# Patient Record
Sex: FEMALE
[De-identification: names, ages, dates, MRNs, and addresses within clinical notes are randomized per-mention and may not be internally consistent; named-entity substitution may affect disease eponyms.]

---

## 2019-07-04 ENCOUNTER — HOSPITAL ENCOUNTER (EMERGENCY)
Dept: HOSPITAL 97 - ER | Age: 39
Discharge: ANOTHER HOSPITAL | End: 2019-07-04
Payer: SELF-PAY

## 2019-07-04 DIAGNOSIS — R29.702: ICD-10-CM

## 2019-07-04 DIAGNOSIS — I63.9: Primary | ICD-10-CM

## 2019-07-04 DIAGNOSIS — R47.01: ICD-10-CM

## 2019-07-04 DIAGNOSIS — F17.210: ICD-10-CM

## 2019-07-04 LAB
ALBUMIN SERPL BCP-MCNC: 3.8 G/DL (ref 3.4–5)
ALP SERPL-CCNC: 97 U/L (ref 45–117)
ALT SERPL W P-5'-P-CCNC: 21 U/L (ref 12–78)
AST SERPL W P-5'-P-CCNC: 14 U/L (ref 15–37)
BUN BLD-MCNC: 5 MG/DL (ref 7–18)
GLUCOSE SERPLBLD-MCNC: 162 MG/DL (ref 74–106)
HCT VFR BLD CALC: 59 % (ref 36–45)
INR BLD: 1.14
LYMPHOCYTES # SPEC AUTO: 1.1 K/UL (ref 0.7–4.9)
MAGNESIUM SERPL-MCNC: 2.2 MG/DL (ref 1.8–2.4)
METHAMPHET UR QL SCN: NEGATIVE
NT-PROBNP SERPL-MCNC: 491 PG/ML (ref ?–125)
PMV BLD: 10.8 FL (ref 7.6–11.3)
POTASSIUM SERPL-SCNC: 3.6 MMOL/L (ref 3.5–5.1)
RBC # BLD: 5.99 M/UL (ref 3.86–4.86)
THC SERPL-MCNC: NEGATIVE NG/ML
TROPONIN (EMERG DEPT USE ONLY): < 0.02 NG/ML (ref 0–0.04)
UA COMPLETE W REFLEX CULTURE PNL UR: (no result)

## 2019-07-04 PROCEDURE — 82140 ASSAY OF AMMONIA: CPT

## 2019-07-04 PROCEDURE — 80048 BASIC METABOLIC PNL TOTAL CA: CPT

## 2019-07-04 PROCEDURE — 84145 PROCALCITONIN (PCT): CPT

## 2019-07-04 PROCEDURE — 87086 URINE CULTURE/COLONY COUNT: CPT

## 2019-07-04 PROCEDURE — 81001 URINALYSIS AUTO W/SCOPE: CPT

## 2019-07-04 PROCEDURE — 36415 COLL VENOUS BLD VENIPUNCTURE: CPT

## 2019-07-04 PROCEDURE — 80307 DRUG TEST PRSMV CHEM ANLYZR: CPT

## 2019-07-04 PROCEDURE — 96375 TX/PRO/DX INJ NEW DRUG ADDON: CPT

## 2019-07-04 PROCEDURE — 96366 THER/PROPH/DIAG IV INF ADDON: CPT

## 2019-07-04 PROCEDURE — 93005 ELECTROCARDIOGRAM TRACING: CPT

## 2019-07-04 PROCEDURE — 83735 ASSAY OF MAGNESIUM: CPT

## 2019-07-04 PROCEDURE — 85610 PROTHROMBIN TIME: CPT

## 2019-07-04 PROCEDURE — 70450 CT HEAD/BRAIN W/O DYE: CPT

## 2019-07-04 PROCEDURE — 96365 THER/PROPH/DIAG IV INF INIT: CPT

## 2019-07-04 PROCEDURE — 99285 EMERGENCY DEPT VISIT HI MDM: CPT

## 2019-07-04 PROCEDURE — 83880 ASSAY OF NATRIURETIC PEPTIDE: CPT

## 2019-07-04 PROCEDURE — 84484 ASSAY OF TROPONIN QUANT: CPT

## 2019-07-04 PROCEDURE — 87088 URINE BACTERIA CULTURE: CPT

## 2019-07-04 PROCEDURE — 83605 ASSAY OF LACTIC ACID: CPT

## 2019-07-04 PROCEDURE — 96361 HYDRATE IV INFUSION ADD-ON: CPT

## 2019-07-04 PROCEDURE — 85025 COMPLETE CBC W/AUTO DIFF WBC: CPT

## 2019-07-04 PROCEDURE — 71045 X-RAY EXAM CHEST 1 VIEW: CPT

## 2019-07-04 PROCEDURE — 80076 HEPATIC FUNCTION PANEL: CPT

## 2019-07-04 PROCEDURE — 87040 BLOOD CULTURE FOR BACTERIA: CPT

## 2019-07-04 PROCEDURE — 82962 GLUCOSE BLOOD TEST: CPT

## 2019-07-04 NOTE — EDPHYS
Physician Documentation                                                                           

 [facility]                                                                 

Name: Jesica Park                                                                             

Age: 39 yrs                                                                                       

Sex: Female                                                                                       

: 1980                                                                                   

MRN: [id number]                                                                                   

Arrival Date: 2019                                                                          

Time: 17:41                                                                                       

Account#: [id number]                                                                            

Bed 4                                                                                             

Private MD:                                                                                       

ED Physician Biju Doty                                                                      

HPI:                                                                                              

                                                                                             

18:00 This 39 yrs old  Female presents to ER via Ambulatory with complaints of        cp  

      Depression.                                                                                 

18:00 The patient presents with decreased mental status.                                      cp  

18:00 Onset: The symptoms/episode began/occurred 1.5 week(s) ago. Possible causes: unknown.   cp  

      Associated signs and symptoms: Pertinent negatives: abdominal pain, agitation, chest        

      pain, headache. Patient's baseline: Neuro: alert and fully oriented, Motor: no              

      deficits, Ambulation: walks without assistance, Speech: normal.                             

                                                                                                  

Historical:                                                                                       

- Allergies:                                                                                      

17:42 No Known Allergies;                                                                     aa5 

- Home Meds:                                                                                      

17:42 None [Active];                                                                          aa5 

- PMHx:                                                                                           

17:42 None;                                                                                   aa5 

- PSHx:                                                                                           

17:42 None;                                                                                   aa5 

                                                                                                  

- Immunization history:: Adult Immunizations unknown.                                             

- Social history:: Smoking status: Patient uses tobacco products, smokes one-half pack            

  cigarettes per day, Patient/guardian denies using alcohol, street drugs.                        

- Ebola Screening: : No symptoms or risks identified at this time.                                

                                                                                                  

                                                                                                  

ROS:                                                                                              

18:04 Constitutional: Negative for body aches, chills, fever, poor PO intake.                 cp  

18:04 Cardiovascular: Negative for chest pain.                                                cp  

18:04 Respiratory: Negative for cough, shortness of breath, wheezing.                             

18:04 Abdomen/GI: Negative for abdominal pain, nausea, vomiting, and diarrhea.                    

18:04 Skin: Negative for rash.                                                                    

18:04 Neuro: Positive for altered mental status, Negative for headache.                           

18:04 All other systems are negative.                                                             

                                                                                                  

Exam:                                                                                             

18:10 Constitutional: The patient appears in no acute distress, alert, awake,                 cp  

      non-diaphoretic, non-toxic, well developed, well nourished.                                 

18:10 Head/Face:  Normocephalic, atraumatic.                                                  cp  

18:10 Eyes: Periorbital structures: appear normal, Pupils: equal, round, and reactive to          

      light and accomodation, Extraocular movements: intact throughout, Conjunctiva: normal,      

      no exudate, no injection, Sclera: no appreciated abnormality, Lids and lashes: appear       

      normal, bilaterally.                                                                        

18:10 ENT: External ear(s): are unremarkable, Ear canal(s): are normal, clear, TM's: bulging,     

      is not appreciated, bilaterally, erythema, that is mild, bilaterally, Nose: is normal,      

      Mouth: Lips: moist, Oral mucosa: moist, Posterior pharynx: Airway: no evidence of           

      obstruction, patent, Dental exam: dental caries, that is severe, diffusely.                 

18:10 Neck: ROM/movement: is normal, is supple, without pain, no range of motions                 

      limitations, no meningismus, no nuchal rigidity.                                            

18:10 Chest/axilla: Inspection: normal, Palpation: is normal, no crepitus, no tenderness.         

18:10 Cardiovascular: Rate: tachycardic, Rhythm: regular, Heart sounds: murmur, not               

      appreciated, Edema: is not appreciated.                                                     

18:10 Respiratory: the patient does not display signs of respiratory distress,  Respirations:     

      normal, no use of accessory muscles, no retractions, no splinting, no tachypnea,            

      labored breathing, is not present, Breath sounds: are clear throughout, no decreased        

      breath sounds, no stridor, no wheezing.                                                     

18:10 Abdomen/GI: Inspection: abdomen appears normal, Palpation: abdomen is soft and              

      non-tender, in all quadrants, rebound tenderness, is not appreciated, voluntary             

      guarding, is not appreciated, involuntary guarding, is not appreciated.                     

18:10 Back: pain, is absent, ROM is normal.                                                       

18:10 Skin: no rash present.                                                                      

18:10 Neuro: Orientation: to person, situation, Not oriented to time, Mentation: able to          

      follow commands, slow to respond, Cerebellar function: Romberg testing is negative,         

      normal finger to nose testing, heel to shin testing is normal, Motor: moves all fours,      

      strength is normal, Sensation: no obvious gross deficits.                                   

18:20 ECG was reviewed by the Attending Physician.                                            cp  

                                                                                                  

Vital Signs:                                                                                      

17:43  / 128; Pulse 107; Resp 20 S; Temp 99.3(O); Pulse Ox 96% on R/A;                  aa5 

18:43  / 130; Pulse 88 MON; Resp 19; Pulse Ox 97% on R/A;                               sv  

19:00  / 111; Pulse 87; Resp 18; Temp 98; Pulse Ox 98% ;                                ea  

20:30  / 92; Pulse 79; Resp 18; Pulse Ox 99% ;                                          ea  

20:42  / 71; Pulse 80; Resp 18; Pulse Ox 99% ;                                          ea  

21:35  / 92; Pulse 75; Resp 18; Temp 98.6; Pulse Ox 99% ;                               ea  

18:43 Sinus Rhythm                                                                            sv  

                                                                                                  

NIH Stroke Scale Scores:                                                                          

18:10 NIHSS Score: 2                                                                          cp  

                                                                                                  

MDM:                                                                                              

17:46 Patient medically screened.                                                             lynda 

17:53 ED course: VS noted. Patient is not a candidate for tpa as last known normal was 1 and  cp  

      1/2 weeks ago.                                                                              

20:15 Data reviewed: vital signs, nurses notes, lab test result(s), EKG, radiologic studies,  cp  

      CT scan, plain films.                                                                       

20:15 Test interpretation: by ED physician or midlevel provider: ECG, plain radiologic        cp  

      studies. Response to treatment: the patient's symptoms have mildly improved after           

      treatment.                                                                                  

20:25 Physician consultation: was called at 20:15, was contacted at 20:15, regarding          cp  

      regarding transfer, to [facility]. patient's condition, DR Healy, hospitalist        

      \T\[facility], will accept patient as transfer.                               

                                                                                                  

                                                                                             

17:55 Order name: AMMONIA                                                                     cp  

/                                                                                             

17:55 Order name: Basic Metabolic Panel                                                       cp  

                                                                                             

17:55 Order name: CBC with Diff                                                               cp  

                                                                                             

17:55 Order name: LFT's                                                                       cp  

/                                                                                             

17:55 Order name: Magnesium                                                                   cp  

                                                                                             

17:55 Order name: NT PRO-BNP                                                                  cp  

/                                                                                             

17:55 Order name: PT-INR; Complete Time: 19:02                                                cp  

/04                                                                                             

19:26 Interpretation: Abnormal: PT 13.4.                                                      cp  

/                                                                                             

17:55 Order name: Troponin (emerg Dept Use Only); Complete Time: 19:25                        cp  

/                                                                                             

17:55 Order name: Lactate; Complete Time: 19:02                                               cp  

/                                                                                             

17:55 Order name: Procalcitonin; Complete Time: 19:25                                         cp  

/                                                                                             

17:55 Order name: Blood Culture Adult (2)                                                     cp  

/                                                                                             

17:56 Order name: Ammonia; Complete Time: 19:02                                               EDMS

                                                                                             

17:56 Order name: Basic Metabolic Panel; Complete Time: 19:25                                 EDMS

                                                                                             

19:25 Interpretation: Normal except: GLUC 162; BUN 5; GFR 86.                                   

                                                                                             

17:56 Order name: CBC with Automated Diff; Complete Time: 19:02                               EDMS

                                                                                             

19:02 Interpretation: Normal except: RBC 5.99; HGB 19.6; HCT 59.0; LUANA% 77.8; LYM% 12.9.        

                                                                                             

17:53 Order name: CT Head Brain wo Cont; Complete Time: 19:32                                   

                                                                                             

17:55 Order name: XRAY Chest (1 view); Complete Time: 19:02                                     

                                                                                             

19:26 Interpretation: Report review.                                                            

                                                                                             

17:56 Order name: Liver (Hepatic) Function; Complete Time: 19:25                              EDMS

                                                                                             

19:25 Interpretation: Normal except: AST 14; BILIT 1.9; BILID 0.4; GLOB 4.4; A/G 0.9.           

                                                                                             

17:56 Order name: Magnesium; Complete Time: 19:25                                             EDMS

                                                                                             

17:56 Order name: NT PRO-BNP; Complete Time: 19:25                                            EDMS

                                                                                             

18:17 Order name: Glucose, Ancillary Testing; Complete Time: 18:26                            EDMS

                                                                                             

18:26 Interpretation: GLUC,ANCIL 149; Reviewed.                                                 

                                                                                             

19:04 Order name: UDS                                                                           

                                                                                             

20:51 Order name: Urinalysis W/Microscopic                                                    EDMS

                                                                                             

21:19 Order name: Urine Culture                                                               [facility]

                                                                                             

17:55 Order name: EKG; Complete Time: 17:56                                                     

                                                                                             

17:55 Order name: Cardiac monitoring; Complete Time: 18:22                                      

                                                                                             

17:55 Order name: EKG - Nurse/Tech; Complete Time: 18:23                                        

                                                                                             

17:55 Order name: IV Saline Lock; Complete Time: 18:23                                          

                                                                                             

17:55 Order name: Labs collected and sent; Complete Time: 18:23                                 

                                                                                             

17:55 Order name: O2 Per Protocol; Complete Time: 18:23                                         

                                                                                             

17:55 Order name: O2 Sat Monitoring; Complete Time: 18:23                                       

                                                                                             

17:55 Order name: Accucheck Blood Glucose; Complete Time: 18:21                                 

                                                                                             

19:04 Order name: Urine Dipstick-Ancillary (obtain specimen)                                  cp  

                                                                                             

19:04 Order name: Urine Pregnancy Test (obtain specimen)                                      cp  

                                                                                                  

EC:20 Rate is 89 beats/min. Rhythm is regular. [place] interval is normal. QRS interval is normal. cp  

      QT interval is normal. Interpreted by me. Reviewed by me.                                   

                                                                                                  

Administered Medications:                                                                         

19:03 CANCELLED (Physician Discretion): NS 0.9% 1000 ml IV at 1 bolus Per protocol; 1000 mL   cp  

      bolus                                                                                       

19:20 Drug: NS 0.9% 1000 ml Route: IV; Rate: 1 bolus; Site: left antecubital;                 rv  

20:30 Follow up: Response: No adverse reaction; IV Status: Completed infusion; IV Intake:     ea  

      1000ml                                                                                      

19:51 Drug: Metoprolol 25 mg Route: PO;                                                       rv  

20:15 Follow up: Response: No adverse reaction                                                ea  

19:51 Drug: Aspirin Chewable Tablet 324 mg Route: PO;                                         rv  

20:35 Follow up: Response: No adverse reaction                                                ea  

19:51 Drug: Lopressor 5 mg Route: IVP; Site: left antecubital;                                rv  

20:05 Follow up: Response: Blood pressure is unchanged                                        ea  

19:51 Drug: foLIC Acid 1 mg Route: IVPB; Site: left antecubital;                              rv  

21:44 Follow up: Response: No adverse reaction; IV Status: Completed infusion                 ea  

20:15 CANCELLED (Physician Discretion): Metoprolol 25 mg PO once                              cp  

20:25 Drug: Metoprolol 5 mg Route: IVP; Site: left antecubital;                               ea  

20:32 Drug: Metoprolol 5 mg Route: IVP; Site: left antecubital;                               ea  

21:00 Follow up: Response: No adverse reaction; Marked relief of symptoms                     ea  

                                                                                                  

                                                                                                  

Point of Care Testing:                                                                            

      Blood Glucose:                                                                              

18:05 Blood Glucose: 149 mg/dL;                                                               sv  

      Ranges:                                                                                     

      Critical Glucose Levels:Adult <50 mg/dl or >400 mg/dl  <40 mg/dl or >180 mg/dl       

Disposition:                                                                                      

21:00 Chart complete.                                                                           

                                                                                                  

Disposition:                                                                                      

19 20:22 Transfer ordered to [facility]. Diagnosis are Aphasia,         

  Cerebral infarction - age indeterminate.                                                        

- Reason for transfer: Higher level of care.                                                      

- Accepting physician is DR Healy.                                                            

- Condition is Fair.                                                                              

- Problem is new.                                                                                 

- Symptoms have improved.                                                                         

                                                                                                  

                                                                                                  

                                                                                                  

                                                                                                  

NIH Stroke Scale - NIH Stroke Score                                                               

Date: 2019                                                                                  

Time: 18:10                                                                                       

Total Score = 2                                                                                   

  1a. Level of Consciousness (LOC) - 0(Alert)                                                     

  1b. Level of Consciousness (LOC) (Year \T\ Age) - 0(Both)                                       

  1c. LOC Commands (Open \T\ Closes Eyes/) - 0(Both)                                          

   2. Best Gaze (Lateral Gaze Paresis) - 0(Normal)                                                

   3. Visual Field Loss - 0(No visual loss)                                                       

   4. Facial Palsy - 0(Normal)                                                                    

  5a. Left Arm: Motor (10-second hold) - 0(No drift)                                              

  5b. Right Arm: Motor (10-second hold) - 0(No drift)                                             

  6a. Left Leg: Motor (5-second hold - always test supine) - 0(No drift)                          

  6b. Right Leg: Motor (5-second hold - always test supine) - 0(No drift)                         

   7. Limb Ataxia (finger/nose \T\ heel/shin - test with eyes open) - 0(Absent)                   

   8. Sensory Loss (pinprick arms/legs/face) - 0(Normal)                                          

   9. Best Language: Aphasia (description/naming/reading) - 1(Mild to moderate                    

      aphasia)                                                                                    

  10. Dysarthria (speech clarity - read or repeat words) - 1(Mild to Moderate)                    

  11. Extinction and Inattention (visual/tactile/auditory/spatial/personal) - 0(No                

      abnormality)                                                                                

Initials: cp                                                                                      

                                                                                                  

Addendum:                                                                                         

07/10/2019                                                                                        

     16:47 Co-signature as Attending Physician, Biju Doty MD I agree with the         [facility]    

           assessment and plan of care.                                                           

                                                                                                  

Signatures:                                                                                       

Dispatcher [employer]                           MALLYMS                                                 

Biju Doty MD MD cha Calderon, Audri, RN                     RN   aa5                                                  

Biju Ayala PA PA cp Antunez, Elena, RN RN ea Vicente, Ronaldo, RN                    RN   rv                                                   

                                                                                                  

Corrections: (The following items were deleted from the chart)                                    

                                                                                             

19:03 19:03 NS 0.9% 1000 ml IV at 1 bolus Per protocol; 1000 mL bolus ordered. cp     cp          

20:15 20:03 Metoprolol 25 mg PO once ordered. cp                                      cp          

20:51 19:05 UA MICROSCOPIC+U.LAB.BRZ ordered. [facility]                                    EDMS        

21:47 20:22 2019 20:22 Transfer ordered to [facility].      ea          

      Diagnosis is Aphasia; Cerebral infarction - age indeterminate. Reason for                   

      transfer: Higher level of care. Accepting physician is DR Healy. Condition              

      is Fair. Problem is new. Symptoms have improved. cp                                         

                                                                                             

16: 20:45 Data reviewed: vital signs, nurses notes, lab test result(s), EKG,  cp          

      radiologic studies, CT scan, I have discussed the patient's presentation/case               

      with the attending Emergency Department Physician; cp                                       

                                                                                             

16: 20:45 Test interpretation: by ED physician or midlevel provider: ECG,     cp          

      plain radiologic studies, cp                                                                

                                                                                             

16: 20:45 Response to treatment: the patient's symptoms have mildly improved  cp          

      after treatment, cp                                                                         

                                                                                                  

**************************************************************************************************

## 2019-07-04 NOTE — RAD REPORT
EXAM DESCRIPTION:  Crio Single View7/4/2019 6:23 pm

 

CLINICAL HISTORY:  Chest pain

 

COMPARISON:  none

 

FINDINGS:   The lungs appear grossly clear. Heart is normal size

 

IMPRESSION:   No acute abnormalities displayed

## 2019-07-04 NOTE — ER
Nurse's Notes                                                                                     

 [facility]                                                                 

Name: Jesica Park                                                                             

Age: 39 yrs                                                                                       

Sex: Female                                                                                       

: 1980                                                                                   

MRN: [id number]                                                                                   

Arrival Date: 2019                                                                          

Time: 17:41                                                                                       

Account#: [id number]                                                                            

Bed 4                                                                                             

Private MD:                                                                                       

Diagnosis: Aphasia;Cerebral infarction-age indeterminate                                          

                                                                                                  

Presentation:                                                                                     

                                                                                             

17:43 Presenting complaint: Pt's  states "she just doesn't want to talk and has been   aa5 

      depressed over the last 2 weeks and just crying". Pt only answering yes or no questions     

      at this time. Pt is A\T\O x 4, pt reports she is depressed.                                 

17:43 Transition of care: patient was not received from another setting of care. Onset of     aa5 

      symptoms was . Care prior to arrival: None.                                             

17:43 Acuity: CHRISTIAN 2                                                                           aa5 

17:43 Method Of Arrival: Ambulatory                                                           aa5 

18:00 Risk Assessment: Do you want to hurt yourself or someone else? Patient reports no       sv  

      desire to harm self or others. Initial Sepsis Screen: Does the patient meet any 2           

      criteria? HR > 90 bpm. No. Patient's initial sepsis screen is negative. Does the            

      patient have a suspected source of infection? No. Patient's initial sepsis screen is        

      negative.                                                                                   

                                                                                                  

Historical:                                                                                       

- Allergies:                                                                                      

17:42 No Known Allergies;                                                                     aa5 

- Home Meds:                                                                                      

17:42 None [Active];                                                                          aa5 

- PMHx:                                                                                           

17:42 None;                                                                                   aa5 

- PSHx:                                                                                           

17:42 None;                                                                                   aa5 

                                                                                                  

- Immunization history:: Adult Immunizations unknown.                                             

- Social history:: Smoking status: Patient uses tobacco products, smokes one-half pack            

  cigarettes per day, Patient/guardian denies using alcohol, street drugs.                        

- Ebola Screening: : No symptoms or risks identified at this time.                                

                                                                                                  

                                                                                                  

Screenin:05 Abuse screen: Denies threats or abuse. Denies injuries from another. Nutritional        sv  

      screening: No deficits noted. Tuberculosis screening: No symptoms or risk factors           

      identified. Fall Risk No fall in past 12 months (0 pts). No secondary diagnosis (0          

      pts). IV access (20 points). Ambulatory Aid- None/Bed Rest/Nurse Assist (0 pts). Gait-      

      Normal/Bed Rest/Wheelchair (0 pts) Mental Status- Oriented to own ability (0 pts).          

      Total Bean Fall Scale indicates No Risk (0-24 pts).                                        

                                                                                                  

Assessment:                                                                                       

18:00 General: Appears uncomfortable, obese, Behavior is cooperative, anxious, crying, flat.  sv  

      General: Family reports pt's onset of symptoms was 1.5 weeks ago, stated by spouse.         

      They stated that she had not been speaking as much and being as active as she normally      

      is. Family reports an excess amount of stress that has happened.. Pain: Denies pain.        

      Neuro: Level of Consciousness is awake, obeys commands, confused, Oriented to person,       

      place,  are equal bilaterally Moves all extremities. Full function Speech is           

      normal, Facial symmetry appears normal, Denies numbness headache. Cardiovascular:           

      Patient's skin is warm and dry. Pulses are 3+ in right radial artery and left radial        

      artery Rhythm is sinus rhythm. Respiratory: Airway is patent Respiratory effort is          

      even, unlabored, Respiratory pattern is regular, symmetrical, Denies shortness of           

      breath. Derm: Skin is normal. Musculoskeletal: Range of motion: intact in all               

      extremities.                                                                                

19:15 General: Appears in no apparent distress. Behavior is cooperative, quiet. Pain: Denies  ea  

      pain. Neuro: Level of Consciousness is awake, alert, obeys commands, Oriented to            

      person, place,  are equal bilaterally Moves all extremities. Cardiovascular:           

      Patient's skin is warm and dry. Cardiovascular: Patient's skin is warm and dry.             

      Respiratory: Airway is patent Respiratory effort is even, unlabored, Respiratory            

      pattern is regular, symmetrical. Derm: Skin is normal. Musculoskeletal: Circulation,        

      motion, and sensation intact.                                                               

20:00 Reassessment: Patient and/or family updated on plan of care and expected duration. Pain ea  

      level reassessed. Pt alert and oriented x 2, answering yes or no questions. Denies          

      pain. Respirations even and unlabored. Chest expansions even and symmetrical.               

20:58 Reassessment: Report called to Genaro GRAYSON at [facility].                                  ea  

21:37 Reassessment: Patient and/or family updated on plan of care and expected duration. Pain ea  

      level reassessed. Pt awake and alert. Respirations even and unlabored. Chest expansions     

      even and symmetrical. No s/s of pain or discomfort noted at this time. EMS at facility      

      for transport, report given to  EMS. Pt left facility via stretcher per EMS. Pt           

      accompanied by significant other.                                                           

                                                                                                  

Vital Signs:                                                                                      

17:43  / 128; Pulse 107; Resp 20 S; Temp 99.3(O); Pulse Ox 96% on R/A;                  aa5 

18:43  / 130; Pulse 88 MON; Resp 19; Pulse Ox 97% on R/A;                               sv  

19:00  / 111; Pulse 87; Resp 18; Temp 98; Pulse Ox 98% ;                                ea  

20:30  / 92; Pulse 79; Resp 18; Pulse Ox 99% ;                                          ea  

20:42  / 71; Pulse 80; Resp 18; Pulse Ox 99% ;                                          ea  

21:35  / 92; Pulse 75; Resp 18; Temp 98.6; Pulse Ox 99% ;                               ea  

18:43 Sinus Rhythm                                                                            sv  

                                                                                                  

NIH Stroke Scale Scores:                                                                          

18:10 NIHSS Score: 2                                                                          cp  

                                                                                                  

ED Course:                                                                                        

17:41 Patient arrived in ED.                                                                  as  

17:43 Arm band placed on Patient placed in an exam room, on a stretcher.                      aa5 

17:46 Biju Ayala PA is PHCP.                                                                cp  

17:46 Biju Doty MD is Attending Physician.                                             cp  

17:51 Triage completed.                                                                       aa5 

17:54 Milady Menendez, RN is Primary Nurse.                                                  sv  

18:05 Patient has correct armband on for positive identification. Bed in low position. Call   sv  

      light in reach. Side rails up X2. Adult w/ patient. Cardiac monitor on. Pulse ox on.        

      NIBP on. Door closed. Head of bed elevated.                                                 

18:05 Initial lab(s) drawn, by me, sent to lab. First set of blood cultures drawn by me.      sv  

      Inserted saline lock: 20 gauge in left antecubital area, using aseptic technique. Blood     

      collected. Flushed left antecubital with 5 ml normal saline.                                

18:07 Radiology exam delayed due to EKG and Xray being done at this time.                     nj  

18:20 X-ray(s) taken.                                                                         sv  

18:22 XRAY Chest (1 view) In Process Unspecified.                                             EDMS

18:23 Basic Metabolic Panel Sent.                                                             sv  

18:23 CBC with Diff Sent.                                                                     sv  

18:23 LFT's Sent.                                                                             sv  

18:23 Magnesium Sent.                                                                         sv  

18:23 NT PRO-BNP Sent.                                                                        sv  

18:23 AMMONIA Sent.                                                                           sv  

18:24 Patient moved to CT via stretcher.                                                      sv  

18:28 CT completed. Patient tolerated procedure well. Patient moved back from CT.             nj  

18:29 CT Head Brain wo Cont In Process Unspecified.                                           EDMS

19:04 Report given to Zahira RN and Clint RN.                                                    sv  

19:08 Primary Nurse role handed off by Milady Menendez RN                                   sv  

19:30 Jamshid Cano RN is Primary Nurse.                                                  rv  

21:35 No provider procedures requiring assistance completed. Patient transferred, IV remains  ea  

      in place.                                                                                   

                                                                                                  

Administered Medications:                                                                         

19:03 CANCELLED (Physician Discretion): NS 0.9% 1000 ml IV at 1 bolus Per protocol; 1000 mL   cp  

      bolus                                                                                       

19:20 Drug: NS 0.9% 1000 ml Route: IV; Rate: 1 bolus; Site: left antecubital;                 rv  

20:30 Follow up: Response: No adverse reaction; IV Status: Completed infusion; IV Intake:     ea  

      1000ml                                                                                      

19:51 Drug: Metoprolol 25 mg Route: PO;                                                       rv  

20:15 Follow up: Response: No adverse reaction                                                ea  

19:51 Drug: Aspirin Chewable Tablet 324 mg Route: PO;                                         rv  

20:35 Follow up: Response: No adverse reaction                                                ea  

19:51 Drug: Lopressor 5 mg Route: IVP; Site: left antecubital;                                rv  

20:05 Follow up: Response: Blood pressure is unchanged                                        ea  

19:51 Drug: foLIC Acid 1 mg Route: IVPB; Site: left antecubital;                              rv  

21:44 Follow up: Response: No adverse reaction; IV Status: Completed infusion                 ea  

20:15 CANCELLED (Physician Discretion): Metoprolol 25 mg PO once                              cp  

20:25 Drug: Metoprolol 5 mg Route: IVP; Site: left antecubital;                               ea  

20:32 Drug: Metoprolol 5 mg Route: IVP; Site: left antecubital;                               ea  

21:00 Follow up: Response: No adverse reaction; Marked relief of symptoms                     ea  

                                                                                                  

                                                                                                  

Point of Care Testing:                                                                            

      Blood Glucose:                                                                              

18:05 Blood Glucose: 149 mg/dL;                                                               sv  

      Ranges:                                                                                     

                                                                                                  

Intake:                                                                                           

20:30 IV: 1000ml; Total: 1000ml.                                                              ea  

                                                                                                  

Outcome:                                                                                          

20:22 ER care complete, transfer ordered by MD.                                               cp  

20:45 Transferred by ground EMS to [facility], Transfer form completed.    ea  

20:45 Condition: stable                                                                           

20:45 Instructed on the need for transfer, Demonstrated understanding of instructions.            

21:47 Patient left the ED.                                                                    ea  

                                                                                                  

                                                                                                  

NIH Stroke Scale - NIH Stroke Score                                                               

Date: 2019                                                                                  

Time: 18:10                                                                                       

Total Score = 2                                                                                   

  1a. Level of Consciousness (LOC) - 0(Alert)                                                     

  1b. Level of Consciousness (LOC) (Year \T\ Age) - 0(Both)                                       

  1c. LOC Commands (Open \T\ Closes Eyes/) - 0(Both)                                          

   2. Best Gaze (Lateral Gaze Paresis) - 0(Normal)                                                

   3. Visual Field Loss - 0(No visual loss)                                                       

   4. Facial Palsy - 0(Normal)                                                                    

  5a. Left Arm: Motor (10-second hold) - 0(No drift)                                              

  5b. Right Arm: Motor (10-second hold) - 0(No drift)                                             

  6a. Left Leg: Motor (5-second hold - always test supine) - 0(No drift)                          

  6b. Right Leg: Motor (5-second hold - always test supine) - 0(No drift)                         

   7. Limb Ataxia (finger/nose \T\ heel/shin - test with eyes open) - 0(Absent)                   

   8. Sensory Loss (pinprick arms/legs/face) - 0(Normal)                                          

   9. Best Language: Aphasia (description/naming/reading) - 1(Mild to moderate                    

      aphasia)                                                                                    

  10. Dysarthria (speech clarity - read or repeat words) - 1(Mild to Moderate)                    

  11. Extinction and Inattention (visual/tactile/auditory/spatial/personal) - 0(No                

      abnormality)                                                                                

Initials: cp                                                                                      

                                                                                                  

Signatures:                                                                                       

Dispatcher [employer]                           Milady Walker RN RN sv Martinez, Amelia as Calderon, Audri, RN RN   aa5                                                  

Biju Ayala PA PA cp Jordan, Nathan nj Antunez, Elena, RN RN ea Vicente, Ronaldo, RN RN   rv                                                   

                                                                                                  

Corrections: (The following items were deleted from the chart)                                    

17:51 17:40 Presenting complaint: Pt's  states "she just doesn't want to talk  aa5         

      and has been depressed over the last 2 weeks and just crying". Pt only                      

      answering yes or no questions at this time. Pt is A\T\O x 4, pt reports she is              

      depressed. aa5                                                                              

                                                                                                  

**************************************************************************************************

## 2019-07-04 NOTE — RAD REPORT
EXAM DESCRIPTION:  CT - Head Brain Wo Cont - 7/4/2019 6:28 pm

 

CLINICAL HISTORY:  Alteration of awareness/confusion

 

COMPARISON:  None

 

TECHNIQUE:  Computed axial tomography of the head was obtained. IV contrast was not requested.

 

All CT scans are performed using dose optimization technique as appropriate and may include automated
 exposure control or mA/KV adjustment according to patient size.

 

FINDINGS:  An intracranial  bleed is not seen .

 

The ventricles are normal in caliber.

 

No extra-axial fluid collection is noted.

 

Small low-density area bilateral thalami .

 

Mild low-density areas within periventricular, deep and subcortical white matter may represent ischem
ic changes secondary to small vessel disease.

 

Fluid within the sinuses/ mastoids is not seen.

 

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 If clinically indicated further evaluation with MRI could be obt
ained